# Patient Record
Sex: MALE | Race: WHITE | NOT HISPANIC OR LATINO | URBAN - METROPOLITAN AREA
[De-identification: names, ages, dates, MRNs, and addresses within clinical notes are randomized per-mention and may not be internally consistent; named-entity substitution may affect disease eponyms.]

---

## 2023-05-08 ENCOUNTER — OFFICE VISIT (OUTPATIENT)
Dept: URBAN - METROPOLITAN AREA CLINIC 78 | Facility: CLINIC | Age: 28
End: 2023-05-08
Payer: COMMERCIAL

## 2023-05-08 VITALS
SYSTOLIC BLOOD PRESSURE: 125 MMHG | TEMPERATURE: 98.2 F | BODY MASS INDEX: 26.53 KG/M2 | DIASTOLIC BLOOD PRESSURE: 73 MMHG | RESPIRATION RATE: 17 BRPM | WEIGHT: 169 LBS | HEART RATE: 75 BPM | HEIGHT: 67 IN

## 2023-05-08 DIAGNOSIS — R10.13 DYSPEPSIA: ICD-10-CM

## 2023-05-08 DIAGNOSIS — R19.8 ALTERNATING CONSTIPATION AND DIARRHEA: ICD-10-CM

## 2023-05-08 DIAGNOSIS — Z86.19 H/O HELICOBACTER INFECTION: ICD-10-CM

## 2023-05-08 PROCEDURE — 99244 OFF/OP CNSLTJ NEW/EST MOD 40: CPT | Performed by: INTERNAL MEDICINE

## 2023-05-08 PROCEDURE — 99204 OFFICE O/P NEW MOD 45 MIN: CPT | Performed by: INTERNAL MEDICINE

## 2023-05-08 NOTE — HPI-TODAY'S VISIT:
The patient was referred to us by VINICIO Cao for dyspepsia and h/o H pylori infection . A copy of this note will be sent to the referring physician.   The patient has never had a colonoscopy previously. There is no FH of colon cancer or colon polyps.  There is no recent history of rectal bleeding. The patient has no pertinent additional complaints of abdominal pain,  anorexia or unintentional weight loss.  His stools are narrow at times.He has noticed alternating constipation and diarrhea.   He has been avoiding lactose. He is also trying to eat healthier.   Regarding any upper GI complaints, the patient has not had nausea, vomiting or dysphagia.  He has heartburn, described a a sour acidic taste up to his mouth.    He has noted a lot of gurgling sounds coming from the stomach. Following this he has to expel gas. He reports increased bloating.  He tested + for H pylori Abs and was treated initially with a 2 week course of Amoxi+Clarithro+PPI. Since he continued feeling bad he was treated again wtih Metronidazole+Doxycycline+Pantorazole.    The patient does not take blood thinners.  They deny any CP or RODRIGUEZ.  Summary of prior workup: - Labs on 2/21/2023: WBC 6.6, hemoglobin 16.8, MCV 91, platelets 249, glucose 97, BUN 13, creatinine 1.05, sodium 136, potassium 4.1, calcium 9.3, total protein 7.3, albumin 5.0, total bilirubin 0.5, alkaline phosphatase 87, AST 16, ALT 18.  Hemoglobin A1c 5.3%.  Total cholesterol 185, triglycerides 81, testosterone 616.  H. pylori IgG antibodies high at 7.67. TSH 0.77.

## 2023-05-10 LAB — H PYLORI BREATH TEST: NEGATIVE

## 2023-05-12 ENCOUNTER — TELEPHONE ENCOUNTER (OUTPATIENT)
Dept: URBAN - METROPOLITAN AREA CLINIC 78 | Facility: CLINIC | Age: 28
End: 2023-05-12

## 2023-05-18 ENCOUNTER — TELEPHONE ENCOUNTER (OUTPATIENT)
Dept: URBAN - METROPOLITAN AREA CLINIC 78 | Facility: CLINIC | Age: 28
End: 2023-05-18

## 2023-06-12 ENCOUNTER — TELEPHONE ENCOUNTER (OUTPATIENT)
Dept: URBAN - METROPOLITAN AREA CLINIC 60 | Facility: CLINIC | Age: 28
End: 2023-06-12

## 2023-06-26 ENCOUNTER — OFFICE VISIT (OUTPATIENT)
Dept: URBAN - METROPOLITAN AREA CLINIC 78 | Facility: CLINIC | Age: 28
End: 2023-06-26
Payer: COMMERCIAL

## 2023-06-26 VITALS
HEART RATE: 61 BPM | DIASTOLIC BLOOD PRESSURE: 79 MMHG | SYSTOLIC BLOOD PRESSURE: 126 MMHG | HEIGHT: 67 IN | RESPIRATION RATE: 16 BRPM | WEIGHT: 165 LBS | BODY MASS INDEX: 25.9 KG/M2 | TEMPERATURE: 98.2 F

## 2023-06-26 DIAGNOSIS — R10.13 DYSPEPSIA: ICD-10-CM

## 2023-06-26 DIAGNOSIS — R19.06 EPIGASTRIC FULLNESS: ICD-10-CM

## 2023-06-26 DIAGNOSIS — R19.8 ALTERNATING CONSTIPATION AND DIARRHEA: ICD-10-CM

## 2023-06-26 PROCEDURE — 99214 OFFICE O/P EST MOD 30 MIN: CPT | Performed by: INTERNAL MEDICINE

## 2023-06-26 NOTE — HPI-TODAY'S VISIT:
The patient was referred to us by VINICIO Cao for dyspepsia and h/o H pylori infection . A copy of this note will be sent to the referring physician.   The patient has never had a colonoscopy previously. There is no FH of colon cancer or colon polyps.   There is no recent history of rectal bleeding. The patient has no pertinent additional complaints of abdominal pain,  anorexia or unintentional weight loss.  His stools are narrow at times.He has noticed alternating constipation and diarrhea. He has been taking the Citrucel with limited results.  He has been avoiding lactose. He is also trying to eat healthier.   Regarding any upper GI complaints, the patient has not had nausea, vomiting or dysphagia.  He has heartburn, described a a sour acidic taste up to his mouth.    He has noted a lot of gurgling sounds coming from the stomach. Following this he has to expel gas. He reports increased bloating and belching. These issues have not gotten any better despite using Gas-X routinely.  He tested + for H pylori Abs and was treated initially with a 2 week course of Amoxi+Clarithro+PPI. Since he continued feeling bad he was treated again wtih Metronidazole+Doxycycline+Pantorazole.    The patient does not take blood thinners.  They deny any CP or RODRIGUEZ.  Summary of prior workup: - H pylori breath test negative on 5/8/23 - Labs on 2/21/2023: WBC 6.6, hemoglobin 16.8, MCV 91, platelets 249, glucose 97, BUN 13, creatinine 1.05, sodium 136, potassium 4.1, calcium 9.3, total protein 7.3, albumin 5.0, total bilirubin 0.5, alkaline phosphatase 87, AST 16, ALT 18.  Hemoglobin A1c 5.3%.  Total cholesterol 185, triglycerides 81, testosterone 616.  H. pylori IgG antibodies high at 7.67. TSH 0.77.

## 2023-08-29 ENCOUNTER — TELEPHONE ENCOUNTER (OUTPATIENT)
Dept: URBAN - METROPOLITAN AREA CLINIC 60 | Facility: CLINIC | Age: 28
End: 2023-08-29

## 2023-09-22 ENCOUNTER — TELEPHONE ENCOUNTER (OUTPATIENT)
Dept: URBAN - METROPOLITAN AREA CLINIC 6 | Facility: CLINIC | Age: 28
End: 2023-09-22

## 2023-09-27 ENCOUNTER — OFFICE VISIT (OUTPATIENT)
Dept: URBAN - METROPOLITAN AREA SURGERY CENTER 15 | Facility: SURGERY CENTER | Age: 28
End: 2023-09-27
Payer: COMMERCIAL

## 2023-09-27 ENCOUNTER — OUT OF OFFICE VISIT (OUTPATIENT)
Dept: URBAN - METROPOLITAN AREA SURGERY CENTER 15 | Facility: SURGERY CENTER | Age: 28
End: 2023-09-27

## 2023-09-27 ENCOUNTER — CLAIMS CREATED FROM THE CLAIM WINDOW (OUTPATIENT)
Dept: URBAN - METROPOLITAN AREA CLINIC 4 | Facility: CLINIC | Age: 28
End: 2023-09-27
Payer: COMMERCIAL

## 2023-09-27 DIAGNOSIS — K29.70 GASTRITIS, UNSPECIFIED, WITHOUT BLEEDING: ICD-10-CM

## 2023-09-27 DIAGNOSIS — K31.89 OTHER DISEASES OF STOMACH AND DUODENUM: ICD-10-CM

## 2023-09-27 DIAGNOSIS — R10.13 DYSPEPSIA: ICD-10-CM

## 2023-09-27 DIAGNOSIS — K29.60 ADENOPAPILLOMATOSIS GASTRICA: ICD-10-CM

## 2023-09-27 DIAGNOSIS — K21.9 GASTRO-ESOPHAGEAL REFLUX DISEASE WITHOUT ESOPHAGITIS: ICD-10-CM

## 2023-09-27 DIAGNOSIS — Z86.19 H/O HELICOBACTER INFECTION: ICD-10-CM

## 2023-09-27 DIAGNOSIS — K21.9 ACID REFLUX: ICD-10-CM

## 2023-09-27 PROCEDURE — 88342 IMHCHEM/IMCYTCHM 1ST ANTB: CPT | Performed by: PATHOLOGY

## 2023-09-27 PROCEDURE — 88341 IMHCHEM/IMCYTCHM EA ADD ANTB: CPT | Performed by: PATHOLOGY

## 2023-09-27 PROCEDURE — 00731 ANES UPR GI NDSC PX NOS: CPT | Performed by: NURSE ANESTHETIST, CERTIFIED REGISTERED

## 2023-09-27 PROCEDURE — 43239 EGD BIOPSY SINGLE/MULTIPLE: CPT | Performed by: INTERNAL MEDICINE

## 2023-09-27 PROCEDURE — G8907 PT DOC NO EVENTS ON DISCHARG: HCPCS | Performed by: INTERNAL MEDICINE

## 2023-09-27 PROCEDURE — 88312 SPECIAL STAINS GROUP 1: CPT | Performed by: PATHOLOGY

## 2023-09-27 PROCEDURE — 88305 TISSUE EXAM BY PATHOLOGIST: CPT | Performed by: PATHOLOGY

## 2023-10-12 ENCOUNTER — OFFICE VISIT (OUTPATIENT)
Dept: URBAN - METROPOLITAN AREA CLINIC 78 | Facility: CLINIC | Age: 28
End: 2023-10-12
Payer: COMMERCIAL

## 2023-10-12 VITALS
DIASTOLIC BLOOD PRESSURE: 72 MMHG | BODY MASS INDEX: 26.53 KG/M2 | SYSTOLIC BLOOD PRESSURE: 120 MMHG | RESPIRATION RATE: 16 BRPM | WEIGHT: 169 LBS | HEART RATE: 72 BPM | TEMPERATURE: 98.2 F | HEIGHT: 67 IN

## 2023-10-12 DIAGNOSIS — R19.8 ALTERNATING CONSTIPATION AND DIARRHEA: ICD-10-CM

## 2023-10-12 DIAGNOSIS — Z86.19 H/O HELICOBACTER INFECTION: ICD-10-CM

## 2023-10-12 DIAGNOSIS — R19.06 EPIGASTRIC FULLNESS: ICD-10-CM

## 2023-10-12 DIAGNOSIS — R10.13 DYSPEPSIA: ICD-10-CM

## 2023-10-12 PROCEDURE — 99214 OFFICE O/P EST MOD 30 MIN: CPT | Performed by: INTERNAL MEDICINE

## 2023-10-12 RX ORDER — FAMOTIDINE 20 MG/1
1 TABLET TABLET, FILM COATED ORAL
Qty: 60 | Refills: 3 | OUTPATIENT
Start: 2023-10-12

## 2023-10-12 NOTE — HPI-TODAY'S VISIT:
The patient was referred to us by VINICIO Cao for dyspepsia and h/o H pylori infection . A copy of this note will be sent to the referring physician.   The patient has never had a colonoscopy previously. There is no FH of colon cancer or colon polyps.   There is no recent history of rectal bleeding. The patient has no pertinent additional complaints of abdominal pain,  anorexia or unintentional weight loss.  His stools are narrow at times.He has noticed alternating constipation and diarrhea. He has been taking the Citrucel with limited results.  He has been avoiding lactose. He is also trying to eat healthier.   Regarding any upper GI complaints, the patient has not had nausea, vomiting or dysphagia.  He has heartburn, described a a sour acidic taste up to his mouth.   She feels a deep sense of hunger at all times.   He has noted a lot of gurgling sounds coming from the stomach. Following this he has to expel gas. He reports increased bloating and belching. These issues have not gotten any better despite using Gas-X & IB Darlyn routinely.  He tested + for H pylori Abs and was treated initially with a 2 week course of Amoxi+Clarithro+PPI. Since he continued feeling bad he was treated again wtih Metronidazole+Doxycycline+Pantorazole.    He states he was perfectly fine until he took the antibiotics for the H pylori infection, and since then his symptoms have been present.   The patient does not take blood thinners.  They deny any CP or RODRIGUEZ.  Summary of prior workup: - EGD by me on 9/2723:  Normal esophagus, no gross lesions in the fundus or body.  Mild patchy erythema in the antrum. Localized discoloration in the prepyloric region of the stomach. Normal duodenum/ampulla. No H pylori, chronic gastritis. + Reflux changes. No celiac.    - H pylori breath test negative on 5/8/23 - Labs on 2/21/2023: WBC 6.6, hemoglobin 16.8, MCV 91, platelets 249, glucose 97, BUN 13, creatinine 1.05, sodium 136, potassium 4.1, calcium 9.3, total protein 7.3, albumin 5.0, total bilirubin 0.5, alkaline phosphatase 87, AST 16, ALT 18.  Hemoglobin A1c 5.3%.  Total cholesterol 185, triglycerides 81, testosterone 616.  H. pylori IgG antibodies high at 7.67. TSH 0.77.

## 2023-12-18 ENCOUNTER — OFFICE VISIT (OUTPATIENT)
Dept: URBAN - METROPOLITAN AREA CLINIC 78 | Facility: CLINIC | Age: 28
End: 2023-12-18
Payer: COMMERCIAL

## 2023-12-18 VITALS
HEIGHT: 67 IN | DIASTOLIC BLOOD PRESSURE: 81 MMHG | BODY MASS INDEX: 25.65 KG/M2 | SYSTOLIC BLOOD PRESSURE: 126 MMHG | RESPIRATION RATE: 16 BRPM | HEART RATE: 71 BPM | WEIGHT: 163.4 LBS | TEMPERATURE: 98.1 F

## 2023-12-18 DIAGNOSIS — R10.13 DYSPEPSIA: ICD-10-CM

## 2023-12-18 DIAGNOSIS — R19.06 EPIGASTRIC FULLNESS: ICD-10-CM

## 2023-12-18 DIAGNOSIS — F43.9 STRESS: ICD-10-CM

## 2023-12-18 DIAGNOSIS — Z86.19 H/O HELICOBACTER INFECTION: ICD-10-CM

## 2023-12-18 DIAGNOSIS — R19.8 ALTERNATING CONSTIPATION AND DIARRHEA: ICD-10-CM

## 2023-12-18 PROBLEM — 73595000: Status: ACTIVE | Noted: 2023-12-18

## 2023-12-18 PROCEDURE — 99214 OFFICE O/P EST MOD 30 MIN: CPT | Performed by: INTERNAL MEDICINE

## 2023-12-18 RX ORDER — FAMOTIDINE 20 MG/1
1 TABLET TABLET, FILM COATED ORAL
Qty: 60 | Refills: 3 | Status: ACTIVE | COMMUNITY
Start: 2023-10-12

## 2023-12-18 RX ORDER — SUCRALFATE 1 G/1
1 TABLET ON AN EMPTY STOMACH TABLET ORAL
Qty: 60 | Refills: 3 | OUTPATIENT
Start: 2023-12-18 | End: 2024-04-16

## 2023-12-18 RX ORDER — FAMOTIDINE 20 MG/1
1 TABLET TABLET, FILM COATED ORAL
Qty: 60 | Refills: 3 | OUTPATIENT

## 2023-12-18 NOTE — HPI-TODAY'S VISIT:
The patient was referred to us by VINICIO Cao for dyspepsia and h/o H pylori infection . A copy of this note will be sent to the referring physician.   The patient has never had a colonoscopy previously. There is no FH of colon cancer or colon polyps.   There is no recent history of rectal bleeding. The patient has no pertinent additional complaints of abdominal pain,  anorexia or unintentional weight loss.  He has noticed complete resolution of the alternating constipation and diarrhea by taking MIralax + Citrucel daily.  He has been avoiding lactose. He is also trying to eat healthier.   Regarding any upper GI complaints, the patient has not had nausea, vomiting or dysphagia.  He has heartburn, described a a sour acidic taste up to his mouth.   He feels a deep sense of hunger at all times.   He has noted a lot of gurgling sounds coming from the stomach. Following this he has to expel gas. He reports increased bloating and belching. These issues have not gotten any better despite using Gas-X & IB Darlyn routinely.  He tested + for H pylori Abs and was treated initially with a 2 week course of Amoxi+Clarithro+PPI. Since he continued feeling bad he was treated again wtih Metronidazole+Doxycycline+Pantorazole.    He states he was perfectly fine until he took the antibiotics for the H pylori infection, and since then his symptoms have been present.   He feels he is under increased stress.  The patient does not take blood thinners.  They deny any CP or RODRIGUEZ.  Summary of prior workup: - EGD by me on 9/2723:  Normal esophagus, no gross lesions in the fundus or body.  Mild patchy erythema in the antrum. Localized discoloration in the prepyloric region of the stomach. Normal duodenum/ampulla. No H pylori, chronic gastritis. + Reflux changes. No celiac.    - H pylori breath test negative on 5/8/23 - Labs on 2/21/2023: WBC 6.6, hemoglobin 16.8, MCV 91, platelets 249, glucose 97, BUN 13, creatinine 1.05, sodium 136, potassium 4.1, calcium 9.3, total protein 7.3, albumin 5.0, total bilirubin 0.5, alkaline phosphatase 87, AST 16, ALT 18.  Hemoglobin A1c 5.3%.  Total cholesterol 185, triglycerides 81, testosterone 616.  H. pylori IgG antibodies high at 7.67. TSH 0.77.

## 2024-03-19 ENCOUNTER — OV EP (OUTPATIENT)
Dept: URBAN - METROPOLITAN AREA CLINIC 78 | Facility: CLINIC | Age: 29
End: 2024-03-19
Payer: COMMERCIAL

## 2024-03-19 VITALS
TEMPERATURE: 98.3 F | SYSTOLIC BLOOD PRESSURE: 133 MMHG | RESPIRATION RATE: 15 BRPM | BODY MASS INDEX: 25.58 KG/M2 | DIASTOLIC BLOOD PRESSURE: 91 MMHG | HEIGHT: 67 IN | HEART RATE: 66 BPM | WEIGHT: 163 LBS

## 2024-03-19 DIAGNOSIS — R19.06 EPIGASTRIC FULLNESS: ICD-10-CM

## 2024-03-19 DIAGNOSIS — Z86.19 H/O HELICOBACTER INFECTION: ICD-10-CM

## 2024-03-19 DIAGNOSIS — R10.13 DYSPEPSIA: ICD-10-CM

## 2024-03-19 DIAGNOSIS — F43.9 STRESS: ICD-10-CM

## 2024-03-19 DIAGNOSIS — R19.8 ALTERNATING CONSTIPATION AND DIARRHEA: ICD-10-CM

## 2024-03-19 PROCEDURE — 99214 OFFICE O/P EST MOD 30 MIN: CPT | Performed by: INTERNAL MEDICINE

## 2024-03-19 RX ORDER — FAMOTIDINE 20 MG/1
1 TABLET TABLET, FILM COATED ORAL
Qty: 60 | Refills: 3 | Status: ON HOLD | COMMUNITY

## 2024-03-19 RX ORDER — FAMOTIDINE 20 MG/1
1 TABLET TABLET, FILM COATED ORAL
Qty: 60 | Refills: 3 | OUTPATIENT

## 2024-03-19 RX ORDER — DESIPRAMINE HYDROCHLORIDE 10 MG/1
1 TABLET TABLET ORAL
Qty: 30 | Refills: 2 | OUTPATIENT
Start: 2024-03-19

## 2024-03-19 RX ORDER — SUCRALFATE 1 G/1
1 TABLET ON AN EMPTY STOMACH TABLET ORAL
Qty: 60 | Refills: 3 | Status: ON HOLD | COMMUNITY
Start: 2023-12-18 | End: 2024-04-16

## 2024-03-19 NOTE — HPI-TODAY'S VISIT:
The patient was referred to us by VINICIO Cao for dyspepsia and epigastric pain. A copy of this note will be sent to the referring physician.   The patient has never had a colonoscopy previously. There is no FH of colon cancer or colon polyps.   There is no recent history of rectal bleeding. The patient has no pertinent additional complaints of  anorexia or unintentional weight loss.  He has noticed complete resolution of the alternating constipation and diarrhea by taking MIralax + Citrucel daily.  He has been avoiding lactose. He is also trying to eat healthier.   Regarding any upper GI complaints, the patient has not had nausea, vomiting or dysphagia.  He has heartburn, described a a sour acidic taste up to his mouth. This has been ongoing. He ran out of Pepcid and needs a new prescription. He feels a deep sense of hunger at all times.   He has noted a lot of gurgling sounds coming from the stomach. Following this he has to expel gas. He reports increased bloating and belching. These issues have not gotten any better despite using Gas-X & IB Darlyn routinely.  He has not felt any improvement despite adding Carafate and Florastor.  he has been drinking potato juice. He continue to feel he has excessive gas and the same discomfort in the epigastric region.  He has noted that stress does make his symptoms worse, but not always.   He tested + for H pylori Abs and was treated initially with a 2 week course of Amoxi+Clarithro+PPI. Since he continued feeling bad he was treated again wtih Metronidazole+Doxycycline+Pantorazole.    He states he was perfectly fine until he took the antibiotics for the H pylori infection, and since then his symptoms have been present.   He feels he is under increased stress.  The patient does not take blood thinners.  They deny any CP or RODRIGUEZ.  Summary of prior workup: - EGD by me on 9/2723:  Normal esophagus, no gross lesions in the fundus or body.  Mild patchy erythema in the antrum. Localized discoloration in the prepyloric region of the stomach. Normal duodenum/ampulla. No H pylori, chronic gastritis. + Reflux changes. No celiac.    - H pylori breath test negative on 5/8/23 - Labs on 2/21/2023: WBC 6.6, hemoglobin 16.8, MCV 91, platelets 249, glucose 97, BUN 13, creatinine 1.05, sodium 136, potassium 4.1, calcium 9.3, total protein 7.3, albumin 5.0, total bilirubin 0.5, alkaline phosphatase 87, AST 16, ALT 18.  Hemoglobin A1c 5.3%.  Total cholesterol 185, triglycerides 81, testosterone 616.  H. pylori IgG antibodies high at 7.67. TSH 0.77.